# Patient Record
Sex: MALE | URBAN - METROPOLITAN AREA
[De-identification: names, ages, dates, MRNs, and addresses within clinical notes are randomized per-mention and may not be internally consistent; named-entity substitution may affect disease eponyms.]

---

## 2023-12-13 ENCOUNTER — APPOINTMENT (RX ONLY)
Dept: URBAN - METROPOLITAN AREA CLINIC 94 | Facility: CLINIC | Age: 43
Setting detail: DERMATOLOGY
End: 2023-12-13

## 2023-12-13 DIAGNOSIS — L81.4 OTHER MELANIN HYPERPIGMENTATION: ICD-10-CM

## 2023-12-13 PROBLEM — D23.39 OTHER BENIGN NEOPLASM OF SKIN OF OTHER PARTS OF FACE: Status: ACTIVE | Noted: 2023-12-13

## 2023-12-13 PROCEDURE — 99213 OFFICE O/P EST LOW 20 MIN: CPT | Mod: NC

## 2023-12-13 PROCEDURE — ? ADDITIONAL NOTES

## 2023-12-13 PROCEDURE — ? COUNSELING

## 2023-12-13 ASSESSMENT — LOCATION ZONE DERM: LOCATION ZONE: FACE

## 2023-12-13 ASSESSMENT — LOCATION SIMPLE DESCRIPTION DERM
LOCATION SIMPLE: RIGHT TEMPLE
LOCATION SIMPLE: LEFT TEMPLE

## 2023-12-13 ASSESSMENT — LOCATION DETAILED DESCRIPTION DERM
LOCATION DETAILED: RIGHT MID TEMPLE
LOCATION DETAILED: LEFT INFERIOR TEMPLE

## 2023-12-13 NOTE — PROCEDURE: ADDITIONAL NOTES
Detail Level: Simple
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
Render Risk Assessment In Note?: no

## 2024-01-03 ENCOUNTER — APPOINTMENT (RX ONLY)
Dept: URBAN - METROPOLITAN AREA CLINIC 95 | Facility: CLINIC | Age: 44
Setting detail: DERMATOLOGY
End: 2024-01-03

## 2024-01-03 DIAGNOSIS — K13.0 DISEASES OF LIPS: ICD-10-CM

## 2024-01-03 DIAGNOSIS — L71.8 OTHER ROSACEA: ICD-10-CM

## 2024-01-03 PROCEDURE — ? COUNSELING

## 2024-01-03 PROCEDURE — ? PRESCRIPTION

## 2024-01-03 PROCEDURE — 99214 OFFICE O/P EST MOD 30 MIN: CPT

## 2024-01-03 PROCEDURE — ? PRESCRIPTION MEDICATION MANAGEMENT

## 2024-01-03 RX ORDER — HYDROCORTISONE 25 MG/G
OINTMENT TOPICAL
Qty: 28.35 | Refills: 3 | Status: ERX | COMMUNITY
Start: 2024-01-03

## 2024-01-03 RX ORDER — METRONIDAZOLE 7.5 MG/G
CREAM TOPICAL
Qty: 45 | Refills: 3 | Status: ERX | COMMUNITY
Start: 2024-01-03

## 2024-01-03 RX ADMIN — HYDROCORTISONE: 25 OINTMENT TOPICAL at 00:00

## 2024-01-03 RX ADMIN — METRONIDAZOLE: 7.5 CREAM TOPICAL at 00:00

## 2024-01-03 ASSESSMENT — LOCATION SIMPLE DESCRIPTION DERM
LOCATION SIMPLE: RIGHT LIP
LOCATION SIMPLE: LEFT CHEEK
LOCATION SIMPLE: RIGHT CHEEK

## 2024-01-03 ASSESSMENT — LOCATION DETAILED DESCRIPTION DERM
LOCATION DETAILED: RIGHT CENTRAL MALAR CHEEK
LOCATION DETAILED: RIGHT ORAL COMMISSURE
LOCATION DETAILED: LEFT INFERIOR CENTRAL MALAR CHEEK

## 2024-01-03 ASSESSMENT — LOCATION ZONE DERM
LOCATION ZONE: LIP
LOCATION ZONE: FACE

## 2024-01-03 NOTE — PROCEDURE: PRESCRIPTION MEDICATION MANAGEMENT
Detail Level: Simple
Render In Strict Bullet Format?: No
Initiate Treatment: Hydrocortisone ointment mix with Lotrimin

## 2024-01-19 ENCOUNTER — APPOINTMENT (RX ONLY)
Dept: URBAN - METROPOLITAN AREA CLINIC 94 | Facility: CLINIC | Age: 44
Setting detail: DERMATOLOGY
End: 2024-01-19

## 2024-01-19 DIAGNOSIS — L57.0 ACTINIC KERATOSIS: ICD-10-CM

## 2024-01-19 DIAGNOSIS — L81.4 OTHER MELANIN HYPERPIGMENTATION: ICD-10-CM

## 2024-01-19 PROBLEM — D23.39 OTHER BENIGN NEOPLASM OF SKIN OF OTHER PARTS OF FACE: Status: ACTIVE | Noted: 2024-01-19

## 2024-01-19 PROCEDURE — ? COUNSELING

## 2024-01-19 PROCEDURE — ? ADDITIONAL NOTES

## 2024-01-19 PROCEDURE — 99213 OFFICE O/P EST LOW 20 MIN: CPT | Mod: 25

## 2024-01-19 PROCEDURE — 17003 DESTRUCT PREMALG LES 2-14: CPT

## 2024-01-19 PROCEDURE — ? LIQUID NITROGEN

## 2024-01-19 PROCEDURE — 17000 DESTRUCT PREMALG LESION: CPT

## 2024-01-19 ASSESSMENT — LOCATION SIMPLE DESCRIPTION DERM
LOCATION SIMPLE: RIGHT EYEBROW
LOCATION SIMPLE: LEFT FOREHEAD
LOCATION SIMPLE: RIGHT FOREHEAD

## 2024-01-19 ASSESSMENT — LOCATION DETAILED DESCRIPTION DERM
LOCATION DETAILED: RIGHT LATERAL FOREHEAD
LOCATION DETAILED: LEFT INFERIOR LATERAL FOREHEAD
LOCATION DETAILED: RIGHT LATERAL EYEBROW
LOCATION DETAILED: LEFT LATERAL FOREHEAD
LOCATION DETAILED: RIGHT FOREHEAD

## 2024-01-19 ASSESSMENT — LOCATION ZONE DERM: LOCATION ZONE: FACE

## 2024-01-19 NOTE — PROCEDURE: LIQUID NITROGEN
Application Tool (Optional): Liquid Nitrogen Sprayer
Render Note In Bullet Format When Appropriate: No
Post-Care Instructions: I reviewed with the patient in detail post-care instructions. Patient is to wear sunprotection, and avoid picking at any of the treated lesions. Pt may apply Vaseline to crusted or scabbing areas.
Duration Of Freeze Thaw-Cycle (Seconds): 1
Consent: The patient's consent was obtained including but not limited to risks of crusting, scabbing, blistering, scarring, darker or lighter pigmentary change, recurrence, incomplete removal and infection.
Show Aperture Variable?: Yes
Detail Level: Detailed
Number Of Freeze-Thaw Cycles: 1 freeze-thaw cycle

## 2024-01-19 NOTE — PROCEDURE: ADDITIONAL NOTES
Render Risk Assessment In Note?: no
Detail Level: Simple
Additional Notes: Courtesy electrocautery performed in office today x 3 lesions nose

## 2024-01-19 NOTE — PROCEDURE: COUNSELING
Patient Specific Counseling (Will Not Stick From Patient To Patient): courtesy ln2 x 1 lesion R nasal tip
Detail Level: Detailed

## 2025-01-30 ENCOUNTER — APPOINTMENT (OUTPATIENT)
Dept: URBAN - METROPOLITAN AREA CLINIC 95 | Facility: CLINIC | Age: 45
Setting detail: DERMATOLOGY
End: 2025-01-30

## 2025-01-30 DIAGNOSIS — L663 OTHER SPECIFIED DISEASES OF HAIR AND HAIR FOLLICLES: ICD-10-CM

## 2025-01-30 DIAGNOSIS — Z71.89 OTHER SPECIFIED COUNSELING: ICD-10-CM

## 2025-01-30 DIAGNOSIS — L85.3 XEROSIS CUTIS: ICD-10-CM

## 2025-01-30 DIAGNOSIS — L738 OTHER SPECIFIED DISEASES OF HAIR AND HAIR FOLLICLES: ICD-10-CM

## 2025-01-30 DIAGNOSIS — L73.9 FOLLICULAR DISORDER, UNSPECIFIED: ICD-10-CM

## 2025-01-30 DIAGNOSIS — L71.8 OTHER ROSACEA: ICD-10-CM | Status: INADEQUATELY CONTROLLED

## 2025-01-30 PROBLEM — L02.02 FURUNCLE OF FACE: Status: ACTIVE | Noted: 2025-01-30

## 2025-01-30 PROBLEM — L02.12 FURUNCLE OF NECK: Status: ACTIVE | Noted: 2025-01-30

## 2025-01-30 PROCEDURE — 99214 OFFICE O/P EST MOD 30 MIN: CPT

## 2025-01-30 PROCEDURE — ? COUNSELING

## 2025-01-30 PROCEDURE — ? PRESCRIPTION

## 2025-01-30 PROCEDURE — ? PRESCRIPTION MEDICATION MANAGEMENT

## 2025-01-30 PROCEDURE — ? OTC TREATMENT REGIMEN

## 2025-01-30 RX ORDER — CLINDAMYCIN PHOSPHATE 10 MG/G
GEL TOPICAL
Qty: 60 | Refills: 2 | Status: ERX | COMMUNITY
Start: 2025-01-30

## 2025-01-30 RX ORDER — DOXYCYCLINE HYCLATE 50 MG/1
CAPSULE, GELATIN COATED ORAL
Qty: 30 | Refills: 2 | Status: CANCELLED | COMMUNITY
Start: 2025-01-30

## 2025-01-30 RX ORDER — IVERMECTIN 10 MG/G
CREAM TOPICAL
Qty: 45 | Refills: 2 | Status: ERX | COMMUNITY
Start: 2025-01-30

## 2025-01-30 RX ADMIN — CLINDAMYCIN PHOSPHATE: 10 GEL TOPICAL at 00:00

## 2025-01-30 RX ADMIN — IVERMECTIN: 10 CREAM TOPICAL at 00:00

## 2025-01-30 RX ADMIN — DOXYCYCLINE HYCLATE: 50 CAPSULE, GELATIN COATED ORAL at 00:00

## 2025-01-30 ASSESSMENT — LOCATION SIMPLE DESCRIPTION DERM
LOCATION SIMPLE: LEFT CHEEK
LOCATION SIMPLE: RIGHT FOREHEAD
LOCATION SIMPLE: RIGHT ANTERIOR NECK
LOCATION SIMPLE: LEFT CHEEK

## 2025-01-30 ASSESSMENT — LOCATION DETAILED DESCRIPTION DERM
LOCATION DETAILED: RIGHT SUPERIOR LATERAL NECK
LOCATION DETAILED: LEFT CENTRAL MALAR CHEEK
LOCATION DETAILED: RIGHT INFERIOR LATERAL FOREHEAD
LOCATION DETAILED: LEFT SUPERIOR MEDIAL BUCCAL CHEEK

## 2025-01-30 ASSESSMENT — LOCATION ZONE DERM
LOCATION ZONE: NECK
LOCATION ZONE: FACE
LOCATION ZONE: FACE

## 2025-01-30 ASSESSMENT — BSA RASH: BSA RASH: 3

## 2025-01-31 ENCOUNTER — RX ONLY (RX ONLY)
Age: 45
End: 2025-01-31

## 2025-01-31 RX ORDER — IVERMECTIN 10 MG/G
CREAM TOPICAL
Qty: 30 | Refills: 3 | Status: ERX

## 2025-02-03 ENCOUNTER — RX ONLY (RX ONLY)
Age: 45
End: 2025-02-03

## 2025-02-03 RX ORDER — DOXYCYCLINE HYCLATE 50 MG/1
CAPSULE, GELATIN COATED ORAL
Qty: 30 | Refills: 2 | Status: ERX

## 2025-03-20 ENCOUNTER — APPOINTMENT (OUTPATIENT)
Dept: URBAN - METROPOLITAN AREA CLINIC 95 | Facility: CLINIC | Age: 45
Setting detail: DERMATOLOGY
End: 2025-03-20

## 2025-03-20 DIAGNOSIS — L71.8 OTHER ROSACEA: ICD-10-CM | Status: WELL CONTROLLED

## 2025-03-20 DIAGNOSIS — L81.1 CHLOASMA: ICD-10-CM

## 2025-03-20 PROCEDURE — 99214 OFFICE O/P EST MOD 30 MIN: CPT

## 2025-03-20 PROCEDURE — ? COUNSELING

## 2025-03-20 PROCEDURE — ? SKIN MEDICINALS

## 2025-03-20 PROCEDURE — ? PRESCRIPTION MEDICATION MANAGEMENT

## 2025-03-20 ASSESSMENT — LOCATION DETAILED DESCRIPTION DERM
LOCATION DETAILED: LEFT MEDIAL MALAR CHEEK
LOCATION DETAILED: LEFT INFERIOR CENTRAL MALAR CHEEK
LOCATION DETAILED: RIGHT CENTRAL MALAR CHEEK
LOCATION DETAILED: RIGHT MEDIAL FOREHEAD
LOCATION DETAILED: RIGHT INFERIOR CENTRAL MALAR CHEEK

## 2025-03-20 ASSESSMENT — SEVERITY ASSESSMENT OVERALL AMONG ALL PATIENTS
IN YOUR EXPERIENCE, AMONG ALL PATIENTS YOU HAVE SEEN WITH THIS CONDITION, HOW SEVERE IS THIS PATIENT'S CONDITION?: MINIMAL

## 2025-03-20 ASSESSMENT — LOCATION SIMPLE DESCRIPTION DERM
LOCATION SIMPLE: LEFT CHEEK
LOCATION SIMPLE: RIGHT CHEEK
LOCATION SIMPLE: RIGHT FOREHEAD

## 2025-03-20 ASSESSMENT — LOCATION ZONE DERM: LOCATION ZONE: FACE

## 2025-03-20 NOTE — PROCEDURE: PRESCRIPTION MEDICATION MANAGEMENT
Detail Level: Zone
Discontinue Regimen: doxycycline hyclate 50 mg capsule PO\\n1 po with food
Continue Regimen: clindamycin 1 % topical gel TP\\nApply daily to face and scalp\\n\\nSoolantra 1 % topical cream TP
Render In Strict Bullet Format?: No

## 2025-03-20 NOTE — PROCEDURE: [facility]
Sig: Apply to affected areas twice daily
Sig: Wash affected areas daily.
Sig: Apply pea sized amount per area at night
Sig: Apply a thin layer to the affected skin twice daily
Detail Level: Simple
Sig: Apply to affected areas on face twice daily
Sig: Apply a thin layer to the affected areas twice daily
Sig: Apply nightly to warts nightly under occlusion
Intro Statement: I recommended the following products:
Sig: Apply to the affected skin twice daily
Sig: Take one twice daily
Sig: Apply a thin layer to the affected areas daily
Sig: Apply a thin layer to the areas with decreased hair density 1-2 times daily
Sig: Take one pill twice daily
Product Type (1): Lightening Cream
Sig: Apply twice daily for 5 days
Sig: Use as directed when washing hair
Sig: Apply a thin layer to the itching areas twice daily as needed
Lightening Cream Prescription 1: Hydroquinone 8%, Tretinoin 0.025%, Kojic Acid 1%, Niacinamide 4%, Fluocinolone 0.025% Cream
Sig: Apply a thin layer to the affected nails daily
Sig: Take one pill daily
Sig: Apply a thin layer to the scar daily

## 2025-05-14 ENCOUNTER — RX ONLY (RX ONLY)
Age: 45
End: 2025-05-14

## 2025-05-14 RX ORDER — DOXYCYCLINE HYCLATE 50 MG/1
CAPSULE, GELATIN COATED ORAL
Qty: 30 | Refills: 2 | Status: ERX